# Patient Record
Sex: FEMALE | URBAN - METROPOLITAN AREA
[De-identification: names, ages, dates, MRNs, and addresses within clinical notes are randomized per-mention and may not be internally consistent; named-entity substitution may affect disease eponyms.]

---

## 2017-05-04 ENCOUNTER — IMPORTED ENCOUNTER (OUTPATIENT)
Dept: URBAN - METROPOLITAN AREA CLINIC 31 | Facility: CLINIC | Age: 32
End: 2017-05-04

## 2017-05-04 PROCEDURE — V2521 CNTCT LENS HYDROPHILIC TORIC: HCPCS

## 2017-05-04 PROCEDURE — 92015 DETERMINE REFRACTIVE STATE: CPT

## 2017-05-04 PROCEDURE — 92004 COMPRE OPH EXAM NEW PT 1/>: CPT

## 2017-05-04 PROCEDURE — 92310 CONTACT LENS FITTING OU: CPT

## 2017-05-04 NOTE — PATIENT DISCUSSION
1.  Refractive error -Continue present contact lens modality. Stop/wear and call if any redness pain or decrease in vision occur. 2.  Return for an appointment in 1 year for comprehensive exam. with Dr. Denton Youssef.

## 2019-01-15 ENCOUNTER — IMPORTED ENCOUNTER (OUTPATIENT)
Dept: URBAN - METROPOLITAN AREA CLINIC 31 | Facility: CLINIC | Age: 34
End: 2019-01-15

## 2019-01-15 PROBLEM — H43.393: Noted: 2019-01-15

## 2019-01-15 PROCEDURE — 92310 CONTACT LENS FITTING OU: CPT

## 2019-01-15 PROCEDURE — V2521 CNTCT LENS HYDROPHILIC TORIC: HCPCS

## 2019-01-15 PROCEDURE — 92014 COMPRE OPH EXAM EST PT 1/>: CPT

## 2019-01-15 NOTE — PATIENT DISCUSSION
1.  Refractive error -Continue present contact lens modality. Stop/wear and call if any redness pain or decrease in vision occur. 2.  Return for an appointment in 1 year for comprehensive exam. with Dr. Hannah Camarena.

## 2019-01-15 NOTE — PATIENT DISCUSSION
1.  Refractive error - Continue present contact lens modality. Stop/wear and call if any redness pain or decrease in vision occur. Glasses change optional. 2.  Return for an appointment in 1 year for comprehensive exam. with Dr. Chevy Aly. 3.  Floaters OU:  Patient was cautioned to call our office immediately if they experience a substantial change in their symptoms such as an increase in floaters persistent flashes loss of visual field (may appear as a shadow or a curtain) or decrease in visual acuity as these may indicate a retinal tear or detachment. If this is a new problem patient will need to return for re-examination  as determined by the [address]. Return for an appointment in 1 year for comprehensive exam. with Dr. Chevy Aly.

## 2020-08-04 NOTE — PATIENT DISCUSSION
The patient was informed that with the Basic + option and a near vision goal, there is no guarantee of achieving near vision without glasses after surgery. They will most likely need prescription glasses at all focal points. The patient elects Basic + OD, goal of -1.75.

## 2020-08-19 NOTE — PATIENT DISCUSSION
Cataract surgery has been performed in the first eye and activities of daily living are still impaired. The patient would like to proceed with cataract surgery in the second eye as scheduled. The patient elects Basic Plus IOL OS, goal -1.75.

## 2021-04-28 NOTE — PATIENT DISCUSSION
OCT abnormal. Order VF to correlate to OCT. Discussed topical medication in future vs. monitoring closely for change over time.

## 2021-11-29 NOTE — PATIENT DISCUSSION
VF repeated today. Discussed normal tension glaucoma vs optic nerve damage from neurological etiology. May require MRI.

## 2022-03-29 NOTE — PATIENT DISCUSSION
Artificial Tears: One drop to both eyes 3-4 times daily. We recommend Systane or Refresh lubricating eye drops which can be found at any pharmacy. HGB 7.6, around baseline   Monitor labs daily

## 2022-04-02 ASSESSMENT — TONOMETRY
OS_IOP_MMHG: 12
OD_IOP_MMHG: 11
OD_IOP_MMHG: 12
OS_IOP_MMHG: 11

## 2022-06-28 NOTE — PATIENT DISCUSSION
1.  Refractive error -Continue present contact lens modality. Stop/wear and call if any redness pain or decrease in vision occur. 2.  Return for an appointment in 1 year for comprehensive exam. with Dr. Abhi Ortega.

## 2022-12-19 NOTE — PATIENT DISCUSSION
Discussed normal tension glaucoma vs optic nerve damage from neurological etiology. May require MRI.

## 2023-01-30 NOTE — PATIENT DISCUSSION
Possible progression at last exam. Pt feel generic combigan too expensive. Sent rx in for cosopt. Discussed SLT.

## 2025-02-03 NOTE — PATIENT DISCUSSION
Patient here for suture removal from right buttock.  All sutures removed without difficulty.  Benzoin and steri strips applied.       The types of intraocular lenses were reviewed with the patient along with a discussion of their various strengths and weaknesses.

## 2025-03-04 NOTE — PATIENT DISCUSSION
Lab Results   Component Value Date    HGBA1C 9.5 (H) 09/01/2024       Recent Labs     03/03/25  1616 03/03/25  2218   POCGLU 324* 257*       Blood Sugar Average: Last 72 hrs:  (P) 290.5    Poorly controlled with hyperglycemia   Home regimen: Lantus 20U qhs and humalog 5U TID AC   Continue home regimen due to hyperglycemia and adjust as necessary   Avoid hypoglycemia    Patient happy with [facility].